# Patient Record
Sex: MALE | Race: WHITE | NOT HISPANIC OR LATINO | URBAN - METROPOLITAN AREA
[De-identification: names, ages, dates, MRNs, and addresses within clinical notes are randomized per-mention and may not be internally consistent; named-entity substitution may affect disease eponyms.]

---

## 2019-10-24 ENCOUNTER — TELEPHONE (OUTPATIENT)
Dept: ENDOSCOPY | Facility: HOSPITAL | Age: 84
End: 2019-10-24

## 2019-10-24 NOTE — TELEPHONE ENCOUNTER
----- Message from Jeanine Basilio MD sent at 10/24/2019 10:47 AM CDT -----  Regarding: RE: External referral  Clinic please    ----- Message -----  From: Lorie Mcdaniel MA  Sent: 10/24/2019   9:52 AM CDT  To: Jeanine Basilio MD  Subject: FW: External referral                                ----- Message -----  From: Jaclyn Chen  Sent: 10/24/2019   9:51 AM CDT  To: Robert QUILES Staff Gallo Zaragoza  Subject: External referral                                Good morning!    Patient being referred to Dr. Jones for removal of carpeted polyp in the hepatic flexure. Referral and records scanned into  for review.    Thanks!  Jaclyn

## 2019-11-21 ENCOUNTER — TELEPHONE (OUTPATIENT)
Dept: GASTROENTEROLOGY | Facility: CLINIC | Age: 84
End: 2019-11-21

## 2019-11-21 ENCOUNTER — OFFICE VISIT (OUTPATIENT)
Dept: GASTROENTEROLOGY | Facility: CLINIC | Age: 84
End: 2019-11-21
Payer: MEDICARE

## 2019-11-21 VITALS — WEIGHT: 157 LBS

## 2019-11-21 DIAGNOSIS — K63.5 POLYP OF COLON, UNSPECIFIED PART OF COLON, UNSPECIFIED TYPE: Primary | ICD-10-CM

## 2019-11-21 PROCEDURE — 1126F PR PAIN SEVERITY QUANTIFIED, NO PAIN PRESENT: ICD-10-PCS | Mod: ,,, | Performed by: INTERNAL MEDICINE

## 2019-11-21 PROCEDURE — 99202 OFFICE O/P NEW SF 15 MIN: CPT | Mod: S$PBB,,, | Performed by: INTERNAL MEDICINE

## 2019-11-21 PROCEDURE — 1159F PR MEDICATION LIST DOCUMENTED IN MEDICAL RECORD: ICD-10-PCS | Mod: ,,, | Performed by: INTERNAL MEDICINE

## 2019-11-21 PROCEDURE — 1159F MED LIST DOCD IN RCRD: CPT | Mod: ,,, | Performed by: INTERNAL MEDICINE

## 2019-11-21 PROCEDURE — 99213 OFFICE O/P EST LOW 20 MIN: CPT | Mod: PBBFAC,PO

## 2019-11-21 PROCEDURE — 99999 PR PBB SHADOW E&M-EST. PATIENT-LVL III: CPT | Mod: PBBFAC,,,

## 2019-11-21 PROCEDURE — 1126F AMNT PAIN NOTED NONE PRSNT: CPT | Mod: ,,, | Performed by: INTERNAL MEDICINE

## 2019-11-21 PROCEDURE — 99202 PR OFFICE/OUTPT VISIT, NEW, LEVL II, 15-29 MIN: ICD-10-PCS | Mod: S$PBB,,, | Performed by: INTERNAL MEDICINE

## 2019-11-21 PROCEDURE — 99999 PR PBB SHADOW E&M-EST. PATIENT-LVL III: ICD-10-PCS | Mod: PBBFAC,,,

## 2019-11-21 RX ORDER — NIFEDIPINE 60 MG/1
60 TABLET, EXTENDED RELEASE ORAL DAILY
COMMUNITY

## 2019-11-21 RX ORDER — MESALAMINE 1000 MG/1
500 SUPPOSITORY RECTAL NIGHTLY
COMMUNITY

## 2019-11-21 NOTE — PROGRESS NOTES
Subjective:      Patient ID: Ede Parham is a 84 y.o. male.    Chief Complaint: No chief complaint on file.      HPI:  Ede Parham is a 84 y.o. 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 Hot snare was performed but resection was only partial. Patient has no complaints today.     Review of patient's allergies indicates:   Allergen Reactions    Codeine Hallucinations    Neosporin [hydrocortisone] Rash       Past Medical History:   Diagnosis Date    Abdominal hernia     Colon polyp        Past Surgical History:   Procedure Laterality Date    COLONOSCOPY      UPPER GASTROINTESTINAL ENDOSCOPY         History reviewed. No pertinent family history.    Social History     Socioeconomic History    Marital status: Unknown     Spouse name: Not on file    Number of children: Not on file    Years of education: Not on file    Highest education level: Not on file   Occupational History    Not on file   Social Needs    Financial resource strain: Not on file    Food insecurity:     Worry: Not on file     Inability: Not on file    Transportation needs:     Medical: Not on file     Non-medical: Not on file   Tobacco Use    Smoking status: Never Smoker    Smokeless tobacco: Never Used   Substance and Sexual Activity    Alcohol use: Not Currently    Drug use: Never    Sexual activity: Not on file   Lifestyle    Physical activity:     Days per week: Not on file     Minutes per session: Not on file    Stress: Not on file   Relationships    Social connections:     Talks on phone: Not on file     Gets together: Not on file     Attends Hoahaoism service: Not on file     Active member of club or organization: Not on file     Attends meetings of clubs or organizations: Not on file     Relationship status: Not on file   Other Topics Concern    Not on file   Social History Narrative    Not on file       Current Outpatient  Medications   Medication Sig Dispense Refill    FLAXSEED OIL ORAL Take by mouth.      glucosamine/chondr ziegler A sod (OSTEO BI-FLEX ORAL) Take by mouth.      mesalamine (CANASA) 1000 MG Supp Place 500 mg rectally nightly.      NIFEdipine (ADALAT CC) 60 MG TbSR Take 60 mg by mouth once daily.       No current facility-administered medications for this visit.             Objective:     Physical Exam   Cardiovascular: Normal rate.   Pulmonary/Chest: Effort normal and breath sounds normal.   Abdominal: Soft. Bowel sounds are normal. He exhibits no distension. There is no tenderness. There is no guarding.       Assessment/Plan:      Large flat colonic polyp, prior endoscopic intervention on the polyp.  -Will try EMR and maybe hybrid APC. Less likely ESD.   - Discussed risks and benefits with patient including risk of bleeding and perforation and the need for surgery.        - Counseling More Than 50% of the Appointment Time

## 2019-11-21 NOTE — PATIENT INSTRUCTIONS
SUPREP Instructions    You are scheduled for a colonoscopy with Dr. Rodrigo Basilio on 12/09/2019 at 930 am [facility] [address] [place] [address]  To ensure that your test is accurate and complete, you MUST follow these instructions listed below.  If you have any questions, please call our office at [phone].  Plan on being at the hospital for your procedure for 3-4 hours.    1.  Follow a CLEAR LIQUID DIET for the entire day before your scheduled colonoscopy.  This means no solid food the entire day starting when you wake.  You may have as much of the clear liquids as you want throughout the day.   CLEAR LIQUID DIET:   - Avoid Red, Orange, Purple, and/or Blue food coloring   - NO DAIRY   - You can have:  Coffee with sugar (no creamer), tea, water, soda, apple or white grape juice, chicken or beef broth/bouillon (no meat, noodles, or veggies), green/yellow popsicles, green/yellow Jell-O, lemonade.    2.  AT 5 pm the evening before your colonoscopy, POUR ONE (1) BOTTLE OF SUPREP INTO THE MIXING CONTAINER, PROVIDED INSIDE THE BOX.  ADD WATER TO THE LINE ON THE CONTAINER AND MIX IT WELL.  DRINK THE ENTIRE CONTAINER AND THEN DRINK TWO (2) MORE CONTAINERS OF WATER OVER THE NEXT 1 HOUR.  This is sometimes easier to drink if this solution is cold, so you can mix the solution 20 minutes ahead of time and place in the refrigerator prior to drinking.  You have to drink the solution within 30-45 minutes of mixing it.  Do NOT put this solution over ice.  It IS ok to drink with a straw.    3.  The endoscopy department will call you 1 day before your colonoscopy to tell you the exact time to arrive, AND to tell you the exact time to drink the 2nd portion of your prep (which will be FIVE HOURS BEFORE YOUR ARRIVAL TIME).  At this time given to you, POUR ONE (1) BOTTLE OF SUPREP INTO THE MIXING CONTAINER, PROVIDED INSIDE THE BOX.  ADD WATER TO THE LINE ON THE CONTAINER AND MIX IT WELL.  DRINK THE ENTIRE CONTAINER AND THEN  DRINK TWO (2) MORE CONTAINERS OF WATER OVER THE NEXT 1 HOUR.  This is sometimes easier to drink if this solution is cold, so you can mix the solution 20 minutes ahead of time and place in the refrigerator prior to drinking.  You have to drink the solution within 30-45 minutes of mixing it.  Do NOT put this solution over ice.  It IS ok to drink with a straw.  Once this is complete, you may not have ANYTHING else by mouth!    4.  You must have someone with you to DRIVE YOU HOME since you will be receiving IV sedation for the colonoscopy.    5.  It is ok to take your heart, blood pressure, and seizure medications in the morning of your test with a SIP of water.  Hold other medications until after your procedure.  Do NOT have anything else to eat or drink the morning of your colonoscopy.  It is ok to brush your teeth.    6.  If you are on blood thinners THAT YOU HAVE BEEN INSTRUCTED TO HOLD BY YOUR DOCTOR FOR THIS PROCEDURE, then do NOT take this the morning of your colonoscopy.  Do NOT stop these medications on your own, they must be approved to be held by your doctor.  Your colonoscopy can NOT be done if you are on these medications.  Examples of blood thinners include: Coumadin, Aggrenox, Plavix, Pradaxa, Reapro, Pletal, Xarelto, Ticagrelor, Brilinta, Eliquis, and high dose aspirin (325 mg).  You do not have to stop baby aspirin 81 mg.    7.  IF YOU ARE DIABETIC:  NO INSULIN OR ORAL MEDICATIONS THE MORNING OF THE COLONOSCOPY.  TAKE ONLY HALF THE DOSE OF YOUR INSULIN THE DAY BEFORE THE COLONOSCOPY.  DO NOT TAKE ANY ORAL DIABETIC MEDICATIONS THE DAY BEFORE THE COLONOSCOPY.  IF YOU ARE AN INSULIN DEPENDENT DIABETIC WITH UNSTABLE BLOOD SUGARS, NOTIFY YOUR PRIMARY CARE PHYSICIAN FOR INSTRUCTIONS.

## 2019-11-22 ENCOUNTER — TELEPHONE (OUTPATIENT)
Dept: GASTROENTEROLOGY | Facility: CLINIC | Age: 84
End: 2019-11-22

## 2019-11-22 NOTE — TELEPHONE ENCOUNTER
----- Message from Vicki Mckinney sent at 11/22/2019 10:20 AM CST -----  Pt wife Erum can be reached at [phone].  Erum is calling to about the enema's that is suppose to be called into [facility].      Please give pt a call back      Thank you!

## 2019-11-26 ENCOUNTER — TELEPHONE (OUTPATIENT)
Dept: ENDOSCOPY | Facility: HOSPITAL | Age: 84
End: 2019-11-26

## 2019-11-26 NOTE — TELEPHONE ENCOUNTER
Received request to schedule patient for Colonoscopy/EMR. Spoke with Patient and wife. Reviewed medical history and medications. Instructed on procedure and prep. Patient and wife verbalized understanding of instructions. Mailed copy of instructions to address in chart.

## 2019-12-19 ENCOUNTER — TELEPHONE (OUTPATIENT)
Dept: ENDOSCOPY | Facility: HOSPITAL | Age: 84
End: 2019-12-19

## 2019-12-23 ENCOUNTER — HOSPITAL ENCOUNTER (OUTPATIENT)
Facility: HOSPITAL | Age: 84
Discharge: HOME | End: 2019-12-23
Attending: INTERNAL MEDICINE | Admitting: INTERNAL MEDICINE
Payer: MEDICARE

## 2019-12-23 ENCOUNTER — ANESTHESIA (OUTPATIENT)
Dept: ENDOSCOPY | Facility: HOSPITAL | Age: 84
End: 2019-12-23
Payer: MEDICARE

## 2019-12-23 ENCOUNTER — ANESTHESIA EVENT (OUTPATIENT)
Dept: ENDOSCOPY | Facility: HOSPITAL | Age: 84
End: 2019-12-23
Payer: MEDICARE

## 2019-12-23 VITALS
DIASTOLIC BLOOD PRESSURE: 92 MMHG | OXYGEN SATURATION: 98 % | WEIGHT: 165 LBS | RESPIRATION RATE: 20 BRPM | SYSTOLIC BLOOD PRESSURE: 149 MMHG | HEIGHT: 67 IN | TEMPERATURE: 98 F | BODY MASS INDEX: 25.9 KG/M2 | HEART RATE: 98 BPM

## 2019-12-23 DIAGNOSIS — K63.5 POLYP OF COLON, UNSPECIFIED PART OF COLON, UNSPECIFIED TYPE: Primary | ICD-10-CM

## 2019-12-23 DIAGNOSIS — K63.5 COLON POLYP: ICD-10-CM

## 2019-12-23 PROCEDURE — 88305 TISSUE EXAM BY PATHOLOGIST: CPT | Mod: 26,,, | Performed by: PATHOLOGY

## 2019-12-23 PROCEDURE — 45390 COLONOSCOPY W/RESECTION: CPT | Performed by: INTERNAL MEDICINE

## 2019-12-23 PROCEDURE — 27201028 HC NEEDLE, SCLERO: Performed by: INTERNAL MEDICINE

## 2019-12-23 PROCEDURE — 45390: ICD-10-PCS | Mod: ,,, | Performed by: INTERNAL MEDICINE

## 2019-12-23 PROCEDURE — 27202087 HC PROBE, APC: Performed by: INTERNAL MEDICINE

## 2019-12-23 PROCEDURE — 63600175 PHARM REV CODE 636 W HCPCS: Performed by: NURSE ANESTHETIST, CERTIFIED REGISTERED

## 2019-12-23 PROCEDURE — 27202363 HC INJECTION AGENT, SUBMUCOSAL, ANY: Performed by: INTERNAL MEDICINE

## 2019-12-23 PROCEDURE — 45390 COLONOSCOPY W/RESECTION: CPT | Mod: ,,, | Performed by: INTERNAL MEDICINE

## 2019-12-23 PROCEDURE — 88305 TISSUE EXAM BY PATHOLOGIST: CPT | Performed by: PATHOLOGY

## 2019-12-23 PROCEDURE — 88305 TISSUE EXAM BY PATHOLOGIST: ICD-10-PCS | Mod: 26,,, | Performed by: PATHOLOGY

## 2019-12-23 PROCEDURE — D9220A PRA ANESTHESIA: ICD-10-PCS | Mod: CRNA,,, | Performed by: NURSE ANESTHETIST, CERTIFIED REGISTERED

## 2019-12-23 PROCEDURE — 45381 COLONOSCOPY SUBMUCOUS NJX: CPT | Performed by: INTERNAL MEDICINE

## 2019-12-23 PROCEDURE — 45385 COLONOSCOPY W/LESION REMOVAL: CPT

## 2019-12-23 PROCEDURE — D9220A PRA ANESTHESIA: Mod: ANES,,, | Performed by: ANESTHESIOLOGY

## 2019-12-23 PROCEDURE — D9220A PRA ANESTHESIA: Mod: CRNA,,, | Performed by: NURSE ANESTHETIST, CERTIFIED REGISTERED

## 2019-12-23 PROCEDURE — D9220A PRA ANESTHESIA: ICD-10-PCS | Mod: ANES,,, | Performed by: ANESTHESIOLOGY

## 2019-12-23 PROCEDURE — 37000008 HC ANESTHESIA 1ST 15 MINUTES: Performed by: INTERNAL MEDICINE

## 2019-12-23 PROCEDURE — 27201089 HC SNARE, DISP (ANY): Performed by: INTERNAL MEDICINE

## 2019-12-23 PROCEDURE — 63600175 PHARM REV CODE 636 W HCPCS: Performed by: INTERNAL MEDICINE

## 2019-12-23 PROCEDURE — 37000009 HC ANESTHESIA EA ADD 15 MINS: Performed by: INTERNAL MEDICINE

## 2019-12-23 RX ORDER — SALIVA STIMULANT COMB. NO.4
SPRAY, NON-AEROSOL (ML) MUCOUS MEMBRANE
COMMUNITY

## 2019-12-23 RX ORDER — UBIDECARENONE 30 MG
30 CAPSULE ORAL 3 TIMES DAILY
COMMUNITY

## 2019-12-23 RX ORDER — SODIUM CHLORIDE 0.9 % (FLUSH) 0.9 %
10 SYRINGE (ML) INJECTION
Status: DISCONTINUED | OUTPATIENT
Start: 2019-12-23 | End: 2019-12-23 | Stop reason: HOSPADM

## 2019-12-23 RX ORDER — LIDOCAINE HCL/PF 100 MG/5ML
SYRINGE (ML) INTRAVENOUS
Status: DISCONTINUED | OUTPATIENT
Start: 2019-12-23 | End: 2019-12-23

## 2019-12-23 RX ORDER — ZINC GLUCONATE 50 MG
50 TABLET ORAL DAILY
COMMUNITY

## 2019-12-23 RX ORDER — GABAPENTIN 300 MG/1
300 CAPSULE ORAL NIGHTLY
COMMUNITY

## 2019-12-23 RX ORDER — MULTIVITAMIN
1 TABLET ORAL DAILY
COMMUNITY

## 2019-12-23 RX ORDER — SODIUM CHLORIDE 0.9 % (FLUSH) 0.9 %
3 SYRINGE (ML) INJECTION
Status: DISCONTINUED | OUTPATIENT
Start: 2019-12-23 | End: 2019-12-23 | Stop reason: HOSPADM

## 2019-12-23 RX ORDER — AMOXICILLIN 500 MG
CAPSULE ORAL DAILY
COMMUNITY

## 2019-12-23 RX ORDER — PROPOFOL 10 MG/ML
VIAL (ML) INTRAVENOUS CONTINUOUS PRN
Status: DISCONTINUED | OUTPATIENT
Start: 2019-12-23 | End: 2019-12-23

## 2019-12-23 RX ORDER — PROPOFOL 10 MG/ML
VIAL (ML) INTRAVENOUS
Status: DISCONTINUED | OUTPATIENT
Start: 2019-12-23 | End: 2019-12-23

## 2019-12-23 RX ORDER — PHENYLEPHRINE HYDROCHLORIDE 10 MG/ML
INJECTION INTRAVENOUS
Status: DISCONTINUED | OUTPATIENT
Start: 2019-12-23 | End: 2019-12-23

## 2019-12-23 RX ORDER — VITAMIN E 268 MG
400 CAPSULE ORAL DAILY
COMMUNITY

## 2019-12-23 RX ORDER — MULTIVIT WITH MINERALS/HERBS
1 TABLET ORAL DAILY
COMMUNITY

## 2019-12-23 RX ORDER — SODIUM CHLORIDE 9 MG/ML
INJECTION, SOLUTION INTRAVENOUS CONTINUOUS
Status: DISCONTINUED | OUTPATIENT
Start: 2019-12-23 | End: 2019-12-23 | Stop reason: HOSPADM

## 2019-12-23 RX ORDER — IBUPROFEN 100 MG/5ML
1000 SUSPENSION, ORAL (FINAL DOSE FORM) ORAL DAILY
COMMUNITY

## 2019-12-23 RX ORDER — GLUCOSAMINE HCL 500 MG
5000 TABLET ORAL
COMMUNITY

## 2019-12-23 RX ORDER — LANOLIN ALCOHOL/MO/W.PET/CERES
1 CREAM (GRAM) TOPICAL 2 TIMES DAILY
COMMUNITY

## 2019-12-23 RX ADMIN — SODIUM CHLORIDE: 0.9 INJECTION, SOLUTION INTRAVENOUS at 08:12

## 2019-12-23 RX ADMIN — PROPOFOL 40 MG: 10 INJECTION, EMULSION INTRAVENOUS at 08:12

## 2019-12-23 RX ADMIN — LIDOCAINE HYDROCHLORIDE 50 MG: 20 INJECTION, SOLUTION INTRAVENOUS at 08:12

## 2019-12-23 RX ADMIN — PROPOFOL 10 MG: 10 INJECTION, EMULSION INTRAVENOUS at 08:12

## 2019-12-23 RX ADMIN — PHENYLEPHRINE HYDROCHLORIDE 100 MCG: 10 INJECTION INTRAVENOUS at 09:12

## 2019-12-23 RX ADMIN — PROPOFOL 100 MCG/KG/MIN: 10 INJECTION, EMULSION INTRAVENOUS at 08:12

## 2019-12-23 NOTE — H&P
History & Physical - Short Stay  Gastroenterology      SUBJECTIVE:     Procedure: Colonoscopy    Chief Complaint/Indication for Procedure: colon polyp    History of Present Illness:  Patient is a 84 y.o. male with colon polyp coming for EMR.     PTA Medications   Medication Sig    ascorbic acid, vitamin C, (VITAMIN C WITH CARLA HIPS) 1000 MG tablet Take 1,000 mg by mouth once daily.    b complex vitamins tablet Take 1 tablet by mouth once daily.    calcium citrate-vitamin D3 315-200 mg (CITRACAL+D) 315-200 mg-unit per tablet Take 1 tablet by mouth 2 (two) times daily.    cholecalciferol, vitamin D3, 3,000 unit Tab Take 5,000 Units by mouth.    co-enzyme Q-10 30 mg capsule Take 30 mg by mouth 3 (three) times daily.    cranberry extract 500 mg Cap Take by mouth.    FLAXSEED OIL ORAL Take by mouth.    gabapentin (NEURONTIN) 300 MG capsule Take 300 mg by mouth every evening.    glucosamine/chondr ziegler A sod (OSTEO BI-FLEX ORAL) Take by mouth.    GOLDENSEAL ROOT ORAL Take by mouth.    Lactobacillus rhamnosus GG (CULTURELLE) 10 billion cell capsule Take 1 capsule by mouth once daily.    multivitamin (ONE DAILY MULTIVITAMIN) per tablet Take 1 tablet by mouth once daily.    NIFEdipine (ADALAT CC) 60 MG TbSR Take 60 mg by mouth once daily.    NON FORMULARY MEDICATION Take 1 tablet by mouth every evening.    NON FORMULARY MEDICATION Take 1 tablet by mouth every evening.    omega-3 fatty acids/fish oil (FISH OIL-OMEGA-3 FATTY ACIDS) 300-1,000 mg capsule Take by mouth once daily.    vitamin E 400 UNIT capsule Take 400 Units by mouth once daily.    zinc gluconate 50 mg tablet Take 50 mg by mouth once daily.    mesalamine (CANASA) 1000 MG Supp Place 500 mg rectally nightly.       Review of patient's allergies indicates:   Allergen Reactions    Codeine Hallucinations    Neosporin [hydrocortisone] Rash        Past Medical History:   Diagnosis Date    Abdominal hernia     AC (acromioclavicular) joint bone spurs,  left     Arthritis     Cancer     prostate cancer    Colon polyp     Heart murmur     Hypertension     Radiation adverse effect     rectal bleeding from radiation from prostate cancer     Past Surgical History:   Procedure Laterality Date    CARPAL TUNNEL RELEASE Left     COLONOSCOPY      EYE SURGERY Bilateral     cataracts    INNER EAR SURGERY      PROSTATE BIOPSY      PROSTATE SURGERY      sinus sugery      UPPER GASTROINTESTINAL ENDOSCOPY       Family History   Problem Relation Age of Onset    Cancer Maternal Grandmother         navel     Social History     Tobacco Use    Smoking status: Never Smoker    Smokeless tobacco: Never Used   Substance Use Topics    Alcohol use: Not Currently    Drug use: Never          OBJECTIVE:     Vital Signs (Most Recent)  Temp: 98.2 °F (36.8 °C) (12/23/19 0756)  Pulse: (!) 111 (12/23/19 0756)  Resp: 18 (12/23/19 0756)  BP: (!) 144/94 (12/23/19 0757)  SpO2: 99 % (12/23/19 0756)         ASSESSMENT/PLAN:     Patient is a 84 y.o. male with colon polyp coming for EMR.     Plan: Colonoscopy    Anesthesia Plan: Moderate Sedation    ASA Grade: ASA 2 - Patient with mild systemic disease with no functional limitations

## 2019-12-23 NOTE — ANESTHESIA PREPROCEDURE EVALUATION
12/23/2019  Ede Parham is a 84 y.o., male.    Anesthesia Evaluation    I have reviewed the Patient Summary Reports.     I have reviewed the Medications.     Review of Systems  Anesthesia Hx:  No problems with previous Anesthesia  History of prior surgery of interest to airway management or planning: Previous anesthesia: General   Social:  Non-Smoker, No Alcohol Use    Hematology/Oncology:  Hematology Normal   Oncology Normal     EENT/Dental:EENT/Dental Normal   Cardiovascular:  Cardiovascular Normal Exercise tolerance: good     Pulmonary:  Pulmonary Normal    Renal/:  Renal/ Normal     Hepatic/GI:  Hepatic/GI Normal    Musculoskeletal:  Musculoskeletal Normal    Neurological:  Neurology Normal    Endocrine:  Endocrine Normal    Dermatological:  Skin Normal    Psych:  Psychiatric Normal           Physical Exam  General:  Well nourished    Airway/Jaw/Neck:  Airway Findings: Mouth Opening: Normal Tongue: Normal  General Airway Assessment: Adult  Mallampati: II  Jaw/Neck Findings:  Neck ROM: Normal ROM      Dental:  Dental Findings: In tact        Mental Status:  Mental Status Findings:  Cooperative, Alert and Oriented         Anesthesia Plan  Type of Anesthesia, risks & benefits discussed:  Anesthesia Type:  general  Patient's Preference: GA  Intra-op Monitoring Plan: standard ASA monitors  Intra-op Monitoring Plan Comments:   Post Op Pain Control Plan: multimodal analgesia  Post Op Pain Control Plan Comments:   Induction:   IV  Beta Blocker:  Patient is not currently on a Beta-Blocker (No further documentation required).       Informed Consent: Patient understands risks and agrees with Anesthesia plan.  Questions answered. Anesthesia consent signed with patient.  ASA Score: 2     Day of Surgery Review of History & Physical:  There are no significant changes.  H&P update referred to the provider.          Ready For Surgery From Anesthesia Perspective.

## 2019-12-23 NOTE — DISCHARGE INSTRUCTIONS
Understanding Colon and Rectal Polyps    The colon (also called the large intestine) is a muscular tube that forms the last part of the digestive tract. It absorbs water and stores food waste. The colon is about 4 to 6 feet long. The rectum is the last 6 inches of the colon. The colon and rectum have a smooth lining composed of millions of cells. Changes in these cells can lead to growths in the colon that can become cancerous and should be removed. Multiple tests are available to screen for colon cancer, but the colonoscopy is the most recommended test. During colonoscopy, these polyps can be removed. How often you need this test depends on many things including your condition, your family history, symptoms, and what the findings were at the previous colonoscopy.   When the colon lining changes  Changes that happen in the cells that line the colon or rectum can lead to growths called polyps. Over a period of years, polyps can turn cancerous. Removing polyps early may prevent cancer from ever forming.  Polyps  Polyps are fleshy clumps of tissue that form on the lining of the colon or rectum. Small polyps are usually benign (not cancerous). However, over time, cells in a polyp can change and become cancerous. Certain types of polyps known as adenomatous polyps are premalignant. The risk for invasive cancer increases with the size of the polyp and certain cell and gene features. This means that they can become cancerous if they're not removed. Hyperplastic polyps are benign. They can grow quite large and not turn cancerous.   Cancer  Almost all colorectal cancers start when polyp cells begin growing abnormally. As a cancerous tumor grows, it may involve more and more of the colon or rectum. In time, cancer can also grow beyond the colon or rectum and spread to nearby organs or to glands called lymph nodes. The cells can also travel to other parts of the body. This is known as metastasis. The earlier a cancerous  tumor is removed, the better the chance of preventing its spread.    Date Last Reviewed: 8/1/2016  © 1371-2815 [employer]. [address] [place] [address], [place] [address]. All rights reserved. This information is not intended as a substitute for professional medical care. Always follow your healthcare professional's instructions.        Colonoscopy     A camera attached to a flexible tube with a viewing lens is used to take video pictures.     Colonoscopy is a test to view the inside of your lower digestive tract (colon and rectum). Sometimes it can show the last part of the small intestine (ileum). During the test, small pieces of tissue may be removed for testing. This is called a biopsy. Small growths, such as polyps, may also be removed.   Why is colonoscopy done?  The test is done to help look for colon cancer. And it can help find the source of abdominal pain, bleeding, and changes in bowel habits. It may be needed once a year, depending on factors such as your:  · Age  · Health history  · Family health history  · Symptoms  · Results from any prior colonoscopy  Risks and possible complications  These include:  · Bleeding               · A puncture or tear in the colon   · Risks of anesthesia  · A cancer lesion not being seen  Getting ready   To prepare for the test:  · Talk with your healthcare provider about the risks of the test (see below). Also ask your healthcare provider about alternatives to the test.  · Tell your healthcare provider about any medicines you take. Also tell him or her about any health conditions you may have.  · Make sure your rectum and colon are empty for the test. Follow the diet and bowel prep instructions exactly. If you dont, the test may need to be rescheduled.  · Plan for a friend or family member to drive you home after the test.     Colonoscopy provides an inside view of the entire colon.     You may discuss the results with your doctor right away or at a future  visit.  During the test   The test is usually done in the hospital on an outpatient basis. This means you go home the same day. The procedure takes about 30 minutes. During that time:  · You are given relaxing (sedating) medicine through an IV line. You may be drowsy, or fully asleep.  · The healthcare provider will first give you a physical exam to check for anal and rectal problems.  · Then the anus is lubricated and the scope inserted.  · If you are awake, you may have a feeling similar to needing to have a bowel movement. You may also feel pressure as air is pumped into the colon. Its OK to pass gas during the procedure.  · Biopsy, polyp removal, or other treatments may be done during the test.  After the test   You may have gas right after the test. It can help to try to pass it to help prevent later bloating. Your healthcare provider may discuss the results with you right away. Or you may need to schedule a follow-up visit to talk about the results. After the test, you can go back to your normal eating and other activities. You may be tired from the sedation and need to rest for a few hours.  Date Last Reviewed: 11/1/2016  © 0833-5851 [employer]. [address] [place] [address], [place] [address]. All rights reserved. This information is not intended as a substitute for professional medical care. Always follow your healthcare professional's instructions.

## 2019-12-23 NOTE — [employer] PATIENT INSTRUCTIONS
Discharge Summary/Instructions after an Endoscopic Procedure  Patient Name: Ede Parham  Patient MRN: [id number]  Patient YOB: 1935 Monday, December 23, 2019  Jeanine Basilio MD  RESTRICTIONS:  During your procedure today, you received medications for sedation.  These   medications may affect your judgment, balance and coordination.  Therefore,   for 24 hours, you have the following restrictions:   - DO NOT drive a car, operate machinery, make legal/financial decisions,   sign important papers or drink alcohol.    ACTIVITY:  Today: no heavy lifting, straining or running due to procedural   sedation/anesthesia.  The following day: return to full activity including work.  DIET:  Eat and drink normally unless instructed otherwise.     TREATMENT FOR COMMON SIDE EFFECTS:  - Mild abdominal pain, nausea, belching, bloating or excessive gas:  rest,   eat lightly and use a heating pad.  - Sore Throat: treat with throat lozenges and/or gargle with warm salt   water.  - Because air was used during the procedure, expelling large amounts of air   from your rectum or belching is normal.  - If a bowel prep was taken, you may not have a bowel movement for 1-3 days.    This is normal.  SYMPTOMS TO WATCH FOR AND REPORT TO YOUR PHYSICIAN:  1. Abdominal pain or bloating, other than gas cramps.  2. Chest pain.  3. Back pain.  4. Signs of infection such as: chills or fever occurring within 24 hours   after the procedure.  5. Rectal bleeding, which would show as bright red, maroon, or black stools.   (A tablespoon of blood from the rectum is not serious, especially if   hemorrhoids are present.)  6. Vomiting.  7. Weakness or dizziness.  GO DIRECTLY TO THE NEAREST EMERGENCY ROOM IF YOU HAVE ANY OF THE FOLLOWING:      Difficulty breathing              Chills and/or fever over 101 F   Persistent vomiting and/or vomiting blood   Severe abdominal pain   Severe chest pain   Black, tarry stools   Bleeding- more than one  tablespoon   Any other symptom or condition that you feel may need urgent attention  Your doctor recommends these additional instructions:  If any biopsies were taken, your doctors clinic will contact you in 1 to 2   weeks with any results.  - Discharge patient to home.   - Continue present medications.   - Await pathology results.   - Repeat colonoscopy in 4 months for surveillance based on pathology   results. Will need to be on liquid diet 2 days prior to colonoscopy.   - Patient has a contact number available for emergencies.  The signs and   symptoms of potential delayed complications were discussed with the   patient.  Return to normal activities tomorrow.  Written discharge   instructions were provided to the patient.   - Clear liquid diet for 1 day, then advance as tolerated to advance diet as   tolerated.  For questions, problems or results please call your physician - Jeanine Basilio MD at Work:  [phone].  [facility], EMERGENCY ROOM PHONE NUMBER: [phone]  IF A COMPLICATION OR EMERGENCY SITUATION ARISES AND YOU ARE UNABLE TO REACH   YOUR PHYSICIAN - GO DIRECTLY TO THE EMERGENCY ROOM.  Jeanine Basilio MD  12/23/2019 9:56:12 AM  This report has been verified and signed electronically.  [employer]

## 2019-12-23 NOTE — PLAN OF CARE
Plan of care discussed with pt and his wife, understanding verbalized PT states can discuss results with his wife

## 2020-01-07 LAB
FINAL PATHOLOGIC DIAGNOSIS: NORMAL
GROSS: NORMAL

## 2020-01-08 ENCOUNTER — TELEPHONE (OUTPATIENT)
Dept: ENDOSCOPY | Facility: HOSPITAL | Age: 85
End: 2020-01-08

## 2020-01-08 NOTE — TELEPHONE ENCOUNTER
----- Message from Mary Martinez sent at 1/8/2020 10:25 AM CST -----  Contact: Dr. Holliday Office - Benita Joy is calling to receive path report from procedure sent over to fax number [phone] due to pt having some issues after surgery. Please give Benita a call back at [phone] for any questions. Pt is having rectal bleeding after procedure if someone can call pt for advice at [phone] .

## 2020-01-11 ENCOUNTER — TELEPHONE (OUTPATIENT)
Dept: ENDOSCOPY | Facility: HOSPITAL | Age: 85
End: 2020-01-11

## 2020-01-11 DIAGNOSIS — K63.5 POLYP OF COLON, UNSPECIFIED PART OF COLON, UNSPECIFIED TYPE: Primary | ICD-10-CM

## 2020-02-05 ENCOUNTER — TELEPHONE (OUTPATIENT)
Dept: ENDOSCOPY | Facility: HOSPITAL | Age: 85
End: 2020-02-05

## 2020-09-09 NOTE — ANESTHESIA POSTPROCEDURE EVALUATION
Anesthesia Post Evaluation    Patient: Ede Parham    Procedure(s) Performed: Procedure(s) (LRB):  COLONOSCOPY/EMR (N/A)    Final Anesthesia Type: general    Patient location during evaluation: PACU  Patient participation: Yes- Able to Participate  Level of consciousness: awake and alert  Post-procedure vital signs: reviewed and stable  Pain management: adequate  Airway patency: patent    PONV status at discharge: No PONV  Anesthetic complications: no      Cardiovascular status: blood pressure returned to baseline  Respiratory status: unassisted  Hydration status: euvolemic  Follow-up not needed.          Vitals Value Taken Time   /92 12/23/2019 10:47 AM   Temp 36.6 °C (97.9 °F) 12/23/2019  9:44 AM   Pulse 98 12/23/2019 10:47 AM   Resp 20 12/23/2019 10:47 AM   SpO2 98 % 12/23/2019 10:47 AM         No case tracking events are documented in the log.      Pain/Clementine Score: Clementine Score: 10 (12/23/2019 10:07 AM)        
Oriented - self; Oriented - place; Oriented - time

## 2020-10-21 ENCOUNTER — HISTORICAL (OUTPATIENT)
Dept: LAB | Facility: HOSPITAL | Age: 85
End: 2020-10-21

## 2020-10-21 LAB
BUN SERPL-MCNC: 13.8 MG/DL (ref 8.4–25.7)
CALCIUM SERPL-MCNC: 9.1 MG/DL (ref 8.8–10)
CHLORIDE SERPL-SCNC: 105 MMOL/L (ref 98–107)
CO2 SERPL-SCNC: 26 MMOL/L (ref 23–31)
CREAT SERPL-MCNC: 0.91 MG/DL (ref 0.72–1.25)
CREAT/UREA NIT SERPL: 15
GLUCOSE SERPL-MCNC: 102 MG/DL (ref 82–115)
POTASSIUM SERPL-SCNC: 4.1 MMOL/L (ref 3.5–5.1)
SODIUM SERPL-SCNC: 141 MMOL/L (ref 136–145)

## 2021-01-21 ENCOUNTER — HISTORICAL (OUTPATIENT)
Dept: CARDIOLOGY | Facility: HOSPITAL | Age: 86
End: 2021-01-21

## 2022-01-22 ENCOUNTER — HOSPITAL ENCOUNTER (OUTPATIENT)
Dept: EMERGENCY MEDICINE | Facility: HOSPITAL | Age: 87
End: 2022-01-23
Attending: INTERNAL MEDICINE | Admitting: INTERNAL MEDICINE

## 2022-01-22 LAB
ABS NEUT (OLG): 5.32 X10(3)/MCL (ref 2.1–9.2)
ALBUMIN SERPL-MCNC: 4.1 GM/DL (ref 3.4–4.8)
ALBUMIN/GLOB SERPL: 1.3 RATIO (ref 1.1–2)
ALP SERPL-CCNC: 62 UNIT/L (ref 40–150)
ALT SERPL-CCNC: 41 UNIT/L (ref 0–55)
APTT PPP: 31.3 SECOND(S) (ref 23.2–33.7)
AST SERPL-CCNC: 32 UNIT/L (ref 5–34)
BASOPHILS # BLD AUTO: 0.1 X10(3)/MCL (ref 0–0.2)
BASOPHILS NFR BLD AUTO: 1 %
BILIRUB SERPL-MCNC: 0.7 MG/DL
BILIRUBIN DIRECT+TOT PNL SERPL-MCNC: 0.3 MG/DL (ref 0–0.5)
BILIRUBIN DIRECT+TOT PNL SERPL-MCNC: 0.4 MG/DL (ref 0–0.8)
BNP BLD-MCNC: 2209 PG/ML
BUN SERPL-MCNC: 21.9 MG/DL (ref 8.4–25.7)
CALCIUM SERPL-MCNC: 8.9 MG/DL (ref 8.7–10.5)
CHLORIDE SERPL-SCNC: 104 MMOL/L (ref 98–107)
CO2 SERPL-SCNC: 24 MMOL/L (ref 23–31)
CREAT SERPL-MCNC: 1.36 MG/DL (ref 0.73–1.18)
EOSINOPHIL # BLD AUTO: 0.2 X10(3)/MCL (ref 0–0.9)
EOSINOPHIL NFR BLD AUTO: 3 %
ERYTHROCYTE [DISTWIDTH] IN BLOOD BY AUTOMATED COUNT: 14.1 % (ref 11.5–17)
GLOBULIN SER-MCNC: 3.1 GM/DL (ref 2.4–3.5)
GLUCOSE SERPL-MCNC: 126 MG/DL (ref 82–115)
HCT VFR BLD AUTO: 43.9 % (ref 42–52)
HGB BLD-MCNC: 14.1 GM/DL (ref 14–18)
INR PPP: 1.2 (ref 0–1.3)
LYMPHOCYTES # BLD AUTO: 2.3 X10(3)/MCL (ref 0.6–4.6)
LYMPHOCYTES NFR BLD AUTO: 26 %
MCH RBC QN AUTO: 31.5 PG (ref 27–31)
MCHC RBC AUTO-ENTMCNC: 32.1 GM/DL (ref 33–36)
MCV RBC AUTO: 98 FL (ref 80–94)
MONOCYTES # BLD AUTO: 0.8 X10(3)/MCL (ref 0.1–1.3)
MONOCYTES NFR BLD AUTO: 10 %
NEUTROPHILS # BLD AUTO: 5.32 X10(3)/MCL (ref 2.1–9.2)
NEUTROPHILS NFR BLD AUTO: 60 %
PLATELET # BLD AUTO: 254 X10(3)/MCL (ref 130–400)
PMV BLD AUTO: 9.6 FL (ref 9.4–12.4)
POTASSIUM SERPL-SCNC: 4 MMOL/L (ref 3.5–5.1)
PROT SERPL-MCNC: 7.2 GM/DL (ref 5.8–7.6)
PROTHROMBIN TIME: 14.5 SECOND(S) (ref 12.5–14.5)
RBC # BLD AUTO: 4.48 X10(6)/MCL (ref 4.7–6.1)
SODIUM SERPL-SCNC: 140 MMOL/L (ref 136–145)
TROPONIN I SERPL-MCNC: <0.01 NG/ML (ref 0–0.04)
WBC # SPEC AUTO: 8.8 X10(3)/MCL (ref 4.5–11.5)

## 2022-01-23 LAB
ABS NEUT (OLG): 6.27 X10(3)/MCL (ref 2.1–9.2)
BASOPHILS # BLD AUTO: 0.1 X10(3)/MCL (ref 0–0.2)
BASOPHILS NFR BLD AUTO: 1 %
BUN SERPL-MCNC: 22.2 MG/DL (ref 8.4–25.7)
CALCIUM SERPL-MCNC: 9.2 MG/DL (ref 8.7–10.5)
CHLORIDE SERPL-SCNC: 101 MMOL/L (ref 98–107)
CO2 SERPL-SCNC: 27 MMOL/L (ref 23–31)
CREAT SERPL-MCNC: 1.12 MG/DL (ref 0.73–1.18)
CREAT/UREA NIT SERPL: 20
EOSINOPHIL # BLD AUTO: 0.2 X10(3)/MCL (ref 0–0.9)
EOSINOPHIL NFR BLD AUTO: 2 %
ERYTHROCYTE [DISTWIDTH] IN BLOOD BY AUTOMATED COUNT: 14 % (ref 11.5–17)
GLUCOSE SERPL-MCNC: 98 MG/DL (ref 82–115)
HCT VFR BLD AUTO: 41.6 % (ref 42–52)
HGB BLD-MCNC: 13.6 GM/DL (ref 14–18)
LYMPHOCYTES # BLD AUTO: 2.6 X10(3)/MCL (ref 0.6–4.6)
LYMPHOCYTES NFR BLD AUTO: 26 %
MCH RBC QN AUTO: 31.2 PG (ref 27–31)
MCHC RBC AUTO-ENTMCNC: 32.7 GM/DL (ref 33–36)
MCV RBC AUTO: 95.4 FL (ref 80–94)
MONOCYTES # BLD AUTO: 1 X10(3)/MCL (ref 0.1–1.3)
MONOCYTES NFR BLD AUTO: 10 %
NEUTROPHILS # BLD AUTO: 6.27 X10(3)/MCL (ref 2.1–9.2)
NEUTROPHILS NFR BLD AUTO: 61 %
PLATELET # BLD AUTO: 248 X10(3)/MCL (ref 130–400)
PMV BLD AUTO: 9.8 FL (ref 9.4–12.4)
POTASSIUM SERPL-SCNC: 3.8 MMOL/L (ref 3.5–5.1)
RBC # BLD AUTO: 4.36 X10(6)/MCL (ref 4.7–6.1)
SARS-COV-2 AG RESP QL IA.RAPID: NEGATIVE
SODIUM SERPL-SCNC: 139 MMOL/L (ref 136–145)
TROPONIN I SERPL-MCNC: 0.02 NG/ML (ref 0–0.04)
TROPONIN I SERPL-MCNC: 0.02 NG/ML (ref 0–0.04)
WBC # SPEC AUTO: 10.2 X10(3)/MCL (ref 4.5–11.5)

## 2022-04-29 NOTE — ED PROVIDER NOTES
Patient:   Ede Parham             MRN: [id number]            FIN: [id number]               Age:   86 years     Sex:  Male     :  1935   Associated Diagnoses:   Congestive heart failure   Author:   Benita Vance MD      Basic Information   Time seen: Date & time 2022 21:28:00.   History source: Patient, spouse.   Arrival mode: Wheelchair.   History limitation: None.   Additional information: Patient's physician(s): Bucky WOODS , Maryellen HOLLEY, Chief Complaint from Nursing Triage Note : Chief Complaint   2022 21:26 CST      Chief Complaint           Pt. C/o fall x1 week with paint to left upper back.. reports increasing pain with sob.. reports having xray since fall.. reports taking blood thinners  .      History of Present Illness   The patient presents with 87 y/o male who presents with fall last week and hit his back and had xrays after this he states but not sure if he broke anything. he states he is having worsening sob, back pain. demetrio ortiz    This is Dr. Emery. I have assumed care upon walking into the room at 2153.    87 y/o male with hx of HTN, prostate cancer, and aortic valve stenosis presents to the ED complaining of left mid back pain that started after a fall two weeks ago. Exacerbating factors include deep breathing. The pt notes the pain makes him occasionally SOB. The pt reports he tripped and fell off of sidewalk when trying to give his dog water and hurt his tailbone and left mid back two weeks ago. He states he was given Toradol for the pain but does not like the way it makes him feel.    .  The onset was 2  weeks ago.  The course/duration of symptoms is Occasional.  Degree at onset mild.  Degree at present mild.  The Exacerbating factors is none.  The Relieving factors is none.  Risk factors consist of hypertension.  Prior episodes: none.  Therapy today: none.  Associated symptoms: back pain.  Additional history: none.        Review of Systems    Constitutional symptoms:  Negative except as documented in HPI.   Skin symptoms:  Negative except as documented in HPI.   Eye symptoms:  Negative except as documented in HPI.   ENMT symptoms:  Negative except as documented in HPI.   Respiratory symptoms:  Shortness of breath.   Cardiovascular symptoms:  Negative except as documented in HPI.   Gastrointestinal symptoms:  Negative except as documented in HPI.   Musculoskeletal symptoms:  left mid back pain and tailbone pain.   Neurologic symptoms:  Negative except as documented in HPI.   Hematologic/Lymphatic symptoms:  Negative except as documented in HPI.             Additional review of systems information: All other systems reviewed and otherwise negative.      Health Status   Allergies:    Allergic Reactions (Selected)  Severity Not Documented  Codeine- Hallucinations.  Neosporin- Rash..   Medications:  (Selected)   Prescriptions  Prescribed  Entresto 24 mg-26 mg oral tablet: 0.5 tab(s), Oral, BID, # 30 tab(s), 11 Refill(s), Pharmacy: [facility], 170, cm, Height/Length Dosing, 08/20/20 10:28:00 CDT, 72.5, kg, Weight Dosing, 08/20/20 10:28:00 CDT  Pantoprazole 40 mg ORAL EC-Tablet: 40 mg = 1 tab(s), Oral, Daily, # 30 tab(s), 6 Refill(s), Pharmacy: [facility], 170, cm, Height/Length Dosing, 08/20/20 10:28:00 CDT, 72.5, kg, Weight Dosing, 08/20/20 10:28:00 CDT  Plavix 75 mg oral tablet: 75 mg = 1 tab(s), Oral, Daily, # 30 tab(s), 11 Refill(s), Pharmacy: [facility], 170, cm, Height/Length Dosing, 08/20/20 10:28:00 CDT, 72.5, kg, Weight Dosing, 08/20/20 10:28:00 CDT  mesalamine 1000 mg rectal suppository: 1,000 mg = 1 supp, AK (rectal), At Bedtime, # 30 supp, 0 Refill(s), Pharmacy: [facility], 170, cm, Height/Length Dosing, 08/20/20 10:28:00 CDT, 72.5, kg, Weight Dosing, 08/20/20 10:28:00 CDT  Documented Medications  Documented  Xanax 0.25 mg oral tablet: 0.25 mg = 1 tab(s), Oral, BID, PRN PRN for anxiety, 0 Refill(s)  aspirin  81 mg oral Delayed Release (EC) tablet: 81 mg = 1 tab(s), Oral, Daily, 0 Refill(s)  escitalopram 10 mg oral tablet: 10 mg = 1 tab(s), Oral, Daily  fluticasone 50 mcg/inh nasal spray: 2 spray(s), Both Nostrils, qAM  furosemide 20 mg oral tablet: 20 mg = 1 tab(s), Oral, Daily  metoprolol succinate 25 mg oral tablet, extended release: 25 mg = 1 tab(s), Oral, Daily  nitroglycerin 0.4 mg sublingual TAB: 0.4 mg = 1 tab(s), SL, q5min, PRN PRN chest pain, 0 Refill(s).   Immunizations: Unknown.      Past Medical/ Family/ Social History   Medical history: Aortic valve stenosis   Cardiomyopathy   Heart murmur   Hiatal hernia   HTN - Hypertension   Prostate cancer    .   Surgical history:    Bleeder Control Gastrointestinal (None) on 8/31/2020 at 85 Years.  Comments:  8/31/2020 14:36 Kimo Vernon RN  auto-populated from documented surgical case  Sigmoidoscopy (None) on 8/31/2020 at 85 Years.  Comments:  8/31/2020 14:36 Kimo Vernon RN  auto-populated from documented surgical case  TAVR-TF (.) on 8/25/2020 at 85 Years.  Comments:  8/25/2020 12:00 JONATHAN Baptiste RN, Shelley KABA  auto-populated from documented surgical case  Colonoscopy on 12/25/2019 at 84 Years.  Sinus Surgery on 3/1/2015 at 79 Years.  Bilateral Extraction of Cataracts on 3/1/2014 at 78 Years.  Bilateral Extraction of Cataracts on 3/1/2010 at 74 Years.  Transurethral Prostatectomy on 3/1/1991 at 55 Years.  Procedure on Ear on 3/1/1975 at 39 Years..   Family history: Unknown.   Social history: Tobacco use: Denies, Family/social situation: .      Physical Examination               Vital Signs   Vital Signs   1/22/2022 22:00 CST      Peripheral Pulse Rate     91 bpm                             Heart Rate Monitored      91 bpm                             Respiratory Rate          18 br/min                             SpO2                      98 %                             Oxygen Therapy            Room air                              Systolic Blood Pressure   122 mmHg                             Diastolic Blood Pressure  95 mmHg  HI    1/22/2022 21:26 CST      Temperature Oral          36.6 DegC                             Temperature Oral (calculated)             97.88 DegF                             Peripheral Pulse Rate     77 bpm                             SpO2                      98 %                             Systolic Blood Pressure   124 mmHg                             Diastolic Blood Pressure  82 mmHg  .   Basic Oxygen Information   1/22/2022 22:00 CST      SpO2                      98 %                             Oxygen Therapy            Room air    1/22/2022 21:26 CST      SpO2                      98 %  .   General:  Alert, no acute distress   Neurological:  Alert and oriented to person, place, time, and situation, No focal neurological deficit observed, CN II-XII intact, normal sensory observed, normal motor observed, normal coordination observed, Speech: Normal.    Skin:  Warm, dry   Head:  Normocephalic, atraumatic   Neck:  Supple, trachea midline, no JVD, no carotid bruit   Eye:  Pupils are equal, round and reactive to light, extraocular movements are intact, normal conjunctiva, vision unchanged   Ears, nose, mouth and throat:  Tympanic membranes clear, oral mucosa moist, no pharyngeal erythema or exudate.    Cardiovascular:  Regular rate and rhythm, No murmur, Normal peripheral perfusion   Respiratory:  Lungs are clear to auscultation, breath sounds are equal   Back:  Normal range of motion, Point tenderness to upper coccyx  tenderness to left posterior mid lower ribs   Musculoskeletal:  Normal ROM, normal strength, no tenderness, no swelling   , pacemaker to left upper chest wall. Gastrointestinal:  Soft, Nontender, Non distended, Normal bowel sounds   Lymphatics:  No lymphadenopathy.   Psychiatric:  Cooperative, appropriate mood & affect      Medical Decision Making   Differential Diagnosis:  Pneumonia,  bronchitis, congestive heart failure, pulmonary edema, pleural effusion, rib fracture and others.    Rationale:  This is Benita Vance M.D.  I assumed care at the patient awaiting CT scan of the chest, abdomen and pelvis to assess for traumatic injury.  Patient is currently resting comfortably without acute complaint but states he is still getting very weak, fatigued and dyspneic with minimal exertion. He can typically walk outside and around his yard without becoming SOB, however currently he can only go approximately 50 feet before becoming symptomatic.  He is compliant with Lasix and his diet. Vital signs stable, oxygen saturation 95% on room air.  HPI, labs and imaging reviewed. Labs remarkable for elevated BNP, CT chest/abdomen/pelvis only significant for bilateral pleural effusions. He did receive Lasix 40mg IV prior to my exam and has put out 1L of urine. He is unfortunately still symptomatic with exertion. I am concerned he gets so weak and SOB that he may fall so I did discuss admission. He agrees to stay overnight for continued diuresis. Hospital medicine agreeable to observation. .   Documents reviewed:  Emergency department nurses' notes.   Orders  Launch Order Profile (Selected)   Inpatient Orders  Ordered  furosemide inj. (IV Push or IM) 10 mg/mL: 40 mg, form: Injection, IV Push, Once, first dose 01/22/22 22:46:00 CST, stop date 01/22/22 22:46:00 CST, STAT  Ordered (Exam Ordered)  CT Abdomen and Pelvis W Contrast: Stat, 01/22/22 22:46:00 CST, Trauma, Left lower chest injury and sacral injury, None, Stretcher, Creatinine if needed per protocol, Rad Type, Schedule this test, 01/22/22 22:46:00 CST  CT Chest Lungs W Contrast: Stat, 01/22/22 22:46:00 CST, Trauma, Left mid lower posterior chest injury, None, Stretcher, Creatinine if needed per protocol, Rad Type, 01/22/22 22:46:00 CST  Ordered (In-Lab)  Troponin-I: STAT collect, 01/22/22 21:46:16 CST, BLOOD, Collected, Stop date 01/22/22 21:46:00 CST, Lab  Collect  Completed  Automated Diff: NOW collect, 22 21:46:00 CST, Blood, Collected, Stop date 22 21:46:00 CST, Lab Collect, Print Label By Order Location, 22 21:30:00 CST  BNP: STAT collect, 22 21:46:16 CST, BLOOD, Collected, Stop date 22 21:46:00 CST, Lab Collect  CBC w/ Auto Diff: NOW collect, 22 21:46:16 CST, BLOOD, Collected, Stop date 22 21:46:00 CST, Lab Collect  CMP: STAT collect, 22 21:46:16 CST, BLOOD, Collected, Stop date 22 21:46:00 CST, Lab Collect  EK22 21:30:00 CST, 22 21:30:00 CST, NOW, -1, -1, 22 21:30:00 CST  Estimated Glomerular Filtration Rate: STAT collect, 22 21:46:00 CST, Blood, Collected, Stop date 22 21:46:00 CST, Lab Collect, Print Label By Order Location, 22 21:30:00 CST  PT: STAT collect, 22 21:46:16 CST, BLOOD, Collected, Stop date 22 21:46:00 CST, Lab Collect  PTT: STAT collect, 22 21:46:16 CST, BLOOD, Collected, Stop date 22 21:46:00 CST, Lab Collect  XR Pelvis 1 or 2 Views: Stat, 22 22:05:00 CST, Trauma, None, Stretcher, Rad Type, Not Scheduled, 22 22:05:00 CST  XR Ribs Bilateral W PA Chest: Stat, 22 21:31:00 CST, Trauma, None, Stretcher, Rad Type, Not Scheduled, 22 21:31:00 CST.   Electrocardiogram:  Time 2022 21:30:00, rate 102, No ST-T changes, normal TN & QRS intervals, EP Interp, The Rhythm is paced.  , Ectopy Premature ventricular contractions (Occasional).    Results review:  Lab results : Lab View   2022 21:46 CST      Sodium Lvl                140 mmol/L                             Potassium Lvl             4.0 mmol/L                             Chloride                  104 mmol/L                             CO2                       24 mmol/L                             Calcium Lvl               8.9 mg/dL                             Glucose Lvl               126 mg/dL  HI                             BUN                        21.9 mg/dL                             Creatinine                1.36 mg/dL  HI                             eGFR-AA                   >60  NA                             eGFR-MARTINA                  53 mL/min/1.73 m2  NA                             Bili Total                0.7 mg/dL                             Bili Direct               0.3 mg/dL                             Bili Indirect             0.40 mg/dL                             AST                       32 unit/L                             ALT                       41 unit/L                             Alk Phos                  62 unit/L                             Total Protein             7.2 gm/dL                             Albumin Lvl               4.1 gm/dL                             Globulin                  3.1 gm/dL                             A/G Ratio                 1.3 ratio                             BNP                       2,209.0 pg/mL  HI                             Troponin-I                <0.010 ng/mL                             WBC                       8.8 x10(3)/mcL                             RBC                       4.48 x10(6)/mcL  LOW                             Hgb                       14.1 gm/dL                             Hct                       43.9 %                             Platelet                  254 x10(3)/mcL                             MCV                       98.0 fL  HI                             MCH                       31.5 pg  HI                             MCHC                      32.1 gm/dL  LOW                             RDW                       14.1 %                             MPV                       9.6 fL                             Abs Neut                  5.32 x10(3)/mcL                             Neutro Auto               60 %  NA                             Lymph Auto                26 %  NA                             Mono Auto                 10 %  NA                              Eos Auto                  3 %  NA                             Abs Eos                   0.2 x10(3)/mcL                             Basophil Auto             1 %  NA                             Abs Neutro                5.32 x10(3)/mcL                             Abs Lymph                 2.3 x10(3)/mcL                             Abs Mono                  0.8 x10(3)/mcL                             Abs Baso                  0.1 x10(3)/mcL                             PT                        14.5 second(s)                             INR                       1.2                             PTT                       31.3 second(s)  .   Chest X-Ray:  Interpretation by Emergency Physician, interstitial edema, no obvious rib fracture.    Radiology results:  Rad Results (ST)  < 12 hrs   Accession: [id number]  Order: XR Ribs Bilateral W PA Chest  Report Dt/Tm: 01/22/2022 22:45  Report:   XR Ribs Bilateral W PA Chest     REASON FOR STUDY: Trauma     TECHNIQUE: As above     COMPARISON: 12/30/2021     FINDINGS:      Heart size enlarged with postsurgical changes of percutaneous aortic  valve replacement. Left chest wall AICD is unchanged. Pulmonary  vasculature appears to be cephalized with coarse #11 reticular nodular  appearance. No evidence for effusion on the right. Questionable  effusion on the left. No pneumothorax. Concern for nodular opacity  within the right midlung.     No definitive rib fractures identified.     IMPRESSION:      1. Findings consistent with interstitial edema.  2. No pneumothorax. No discernible displaced rib fracture involving  the right or left hemithorax.         Accession: [id number]  Order: XR Pelvis 1 or 2 Views  Report Dt/Tm: 01/22/2022 22:27  Report:   XR Pelvis 1 or 2 Views     REASON FOR STUDY: Trauma     TECHNIQUE: As above     COMPARISON: No relevant comparison studies available at the time of  dictation     FINDINGS:     No fracture. Osteopenia is identified. Femoral heads are  well-seated  within the acetabulum with joint space narrowing.     Degenerative changes of the lower lumbar spine facets. Degenerative  changes of the pubic symphysis and bilateral SI joints.     IMPRESSION:      No fracture with osteopenia.         .      Reexamination/ Reevaluation   We will obtain CT scan of the chest as well as abdomen and pelvis because of the trauma.  I do think that the patient's shortness of breath may be secondary to some vascular congestion as IV Lasix also given in the ED.   Time: 1/23/2022 03:33:00 .   Vital signs   results included from flowsheet : Vital Signs   1/23/2022 2:48 CST       Peripheral Pulse Rate     100 bpm                             Heart Rate Monitored      100 bpm                             Respiratory Rate          20 br/min                             SpO2                      95 %                             Oxygen Therapy            Room air                             Systolic Blood Pressure   109 mmHg                             Diastolic Blood Pressure  72 mmHg    1/23/2022 1:08 CST       Peripheral Pulse Rate     98 bpm                             Heart Rate Monitored      92 bpm                             Respiratory Rate          19 br/min                             SpO2                      97 %                             Oxygen Therapy            Room air                             Systolic Blood Pressure   118 mmHg                             Diastolic Blood Pressure  85 mmHg    1/23/2022 0:20 CST       Peripheral Pulse Rate     96 bpm                             Heart Rate Monitored      94 bpm                             Respiratory Rate          19 br/min                             SpO2                      97 %                             Oxygen Therapy            Room air                             Systolic Blood Pressure   116 mmHg                             Diastolic Blood Pressure  80 mmHg    1/22/2022 22:00 CST      Peripheral Pulse Rate      91 bpm                             Heart Rate Monitored      91 bpm                             Respiratory Rate          18 br/min                             SpO2                      98 %                             Oxygen Therapy            Room air                             Systolic Blood Pressure   122 mmHg                             Diastolic Blood Pressure  95 mmHg  HI     Course: unchanged.   Assessment: exam unchanged.      Impression and Plan   Diagnosis   Congestive heart failure ([id number] I50.9)      Calls-Consults   -  1/23/2022 02:44:00 , Hospitalist, recommends Spoke with Stefano Meléndez at 0214, pt will be admitted at this time.    Plan   Condition: Unchanged.    Disposition: Admit time  1/23/2022 02:44:00, Place in Observation Telemetry Unit, Stefano Chase MD, Patient care transitioned to: Time: 1/22/2022 23:59:00, Pinky WOODS, Benita Martin.    Counseled: Patient, Family, Regarding diagnosis, Regarding diagnostic results, Regarding treatment plan, Patient indicated understanding of instructions, Wife verbalizes understanding .    Notes: I, Savanna Martinez, acted solely as a scribe for and in the presence of Dr. Emery who performed this service., I  personally performed all of the above services as recorded in this chart.